# Patient Record
Sex: FEMALE | Race: WHITE | NOT HISPANIC OR LATINO
[De-identification: names, ages, dates, MRNs, and addresses within clinical notes are randomized per-mention and may not be internally consistent; named-entity substitution may affect disease eponyms.]

---

## 2018-10-16 ENCOUNTER — TRANSCRIPTION ENCOUNTER (OUTPATIENT)
Age: 38
End: 2018-10-16

## 2018-10-16 ENCOUNTER — EMERGENCY (EMERGENCY)
Facility: HOSPITAL | Age: 38
LOS: 1 days | Discharge: HOME | End: 2018-10-16
Attending: EMERGENCY MEDICINE | Admitting: EMERGENCY MEDICINE
Payer: COMMERCIAL

## 2018-10-16 VITALS
TEMPERATURE: 98 F | OXYGEN SATURATION: 99 % | SYSTOLIC BLOOD PRESSURE: 118 MMHG | DIASTOLIC BLOOD PRESSURE: 75 MMHG | RESPIRATION RATE: 15 BRPM | HEART RATE: 75 BPM

## 2018-10-16 VITALS
TEMPERATURE: 98 F | RESPIRATION RATE: 20 BRPM | HEART RATE: 85 BPM | OXYGEN SATURATION: 96 % | SYSTOLIC BLOOD PRESSURE: 112 MMHG | WEIGHT: 173.28 LBS | DIASTOLIC BLOOD PRESSURE: 68 MMHG

## 2018-10-16 DIAGNOSIS — Z3A.16 16 WEEKS GESTATION OF PREGNANCY: ICD-10-CM

## 2018-10-16 DIAGNOSIS — O26.892 OTHER SPECIFIED PREGNANCY RELATED CONDITIONS, SECOND TRIMESTER: ICD-10-CM

## 2018-10-16 DIAGNOSIS — N89.8 OTHER SPECIFIED NONINFLAMMATORY DISORDERS OF VAGINA: ICD-10-CM

## 2018-10-16 DIAGNOSIS — Z98.89 OTHER SPECIFIED POSTPROCEDURAL STATES: Chronic | ICD-10-CM

## 2018-10-16 LAB
ALBUMIN SERPL ELPH-MCNC: 4 G/DL — SIGNIFICANT CHANGE UP (ref 3.3–5)
ALP SERPL-CCNC: 69 U/L — SIGNIFICANT CHANGE UP (ref 40–120)
ALT FLD-CCNC: 14 U/L — SIGNIFICANT CHANGE UP (ref 10–45)
ANION GAP SERPL CALC-SCNC: 15 MMOL/L — SIGNIFICANT CHANGE UP (ref 5–17)
APPEARANCE UR: CLEAR — SIGNIFICANT CHANGE UP
AST SERPL-CCNC: 13 U/L — SIGNIFICANT CHANGE UP (ref 10–40)
BASOPHILS NFR BLD AUTO: 0.1 % — SIGNIFICANT CHANGE UP (ref 0–2)
BILIRUB SERPL-MCNC: <0.2 MG/DL — SIGNIFICANT CHANGE UP (ref 0.2–1.2)
BILIRUB UR-MCNC: NEGATIVE — SIGNIFICANT CHANGE UP
BLD GP AB SCN SERPL QL: NEGATIVE — SIGNIFICANT CHANGE UP
BUN SERPL-MCNC: 6 MG/DL — LOW (ref 7–23)
CALCIUM SERPL-MCNC: 9.6 MG/DL — SIGNIFICANT CHANGE UP (ref 8.4–10.5)
CHLORIDE SERPL-SCNC: 100 MMOL/L — SIGNIFICANT CHANGE UP (ref 96–108)
CO2 SERPL-SCNC: 24 MMOL/L — SIGNIFICANT CHANGE UP (ref 22–31)
COLOR SPEC: YELLOW — SIGNIFICANT CHANGE UP
CREAT SERPL-MCNC: 0.5 MG/DL — SIGNIFICANT CHANGE UP (ref 0.5–1.3)
DIFF PNL FLD: NEGATIVE — SIGNIFICANT CHANGE UP
EOSINOPHIL NFR BLD AUTO: 1.7 % — SIGNIFICANT CHANGE UP (ref 0–6)
GLUCOSE SERPL-MCNC: 90 MG/DL — SIGNIFICANT CHANGE UP (ref 70–99)
GLUCOSE UR QL: NEGATIVE — SIGNIFICANT CHANGE UP
HCG SERPL-ACNC: HIGH MIU/ML
HCT VFR BLD CALC: 36.3 % — SIGNIFICANT CHANGE UP (ref 34.5–45)
HGB BLD-MCNC: 11.9 G/DL — SIGNIFICANT CHANGE UP (ref 11.5–15.5)
KETONES UR-MCNC: NEGATIVE — SIGNIFICANT CHANGE UP
LEUKOCYTE ESTERASE UR-ACNC: NEGATIVE — SIGNIFICANT CHANGE UP
LYMPHOCYTES # BLD AUTO: 18.5 % — SIGNIFICANT CHANGE UP (ref 13–44)
MCHC RBC-ENTMCNC: 27 PG — SIGNIFICANT CHANGE UP (ref 27–34)
MCHC RBC-ENTMCNC: 32.8 G/DL — SIGNIFICANT CHANGE UP (ref 32–36)
MCV RBC AUTO: 82.5 FL — SIGNIFICANT CHANGE UP (ref 80–100)
MONOCYTES NFR BLD AUTO: 6.5 % — SIGNIFICANT CHANGE UP (ref 2–14)
NEUTROPHILS NFR BLD AUTO: 73.2 % — SIGNIFICANT CHANGE UP (ref 43–77)
NITRITE UR-MCNC: NEGATIVE — SIGNIFICANT CHANGE UP
PH UR: 7 — SIGNIFICANT CHANGE UP (ref 5–8)
PLATELET # BLD AUTO: 223 K/UL — SIGNIFICANT CHANGE UP (ref 150–400)
POTASSIUM SERPL-MCNC: 3.7 MMOL/L — SIGNIFICANT CHANGE UP (ref 3.5–5.3)
POTASSIUM SERPL-SCNC: 3.7 MMOL/L — SIGNIFICANT CHANGE UP (ref 3.5–5.3)
PROT SERPL-MCNC: 7.6 G/DL — SIGNIFICANT CHANGE UP (ref 6–8.3)
PROT UR-MCNC: NEGATIVE MG/DL — SIGNIFICANT CHANGE UP
RBC # BLD: 4.4 M/UL — SIGNIFICANT CHANGE UP (ref 3.8–5.2)
RBC # FLD: 14.9 % — SIGNIFICANT CHANGE UP (ref 10.3–16.9)
RH IG SCN BLD-IMP: POSITIVE — SIGNIFICANT CHANGE UP
SODIUM SERPL-SCNC: 139 MMOL/L — SIGNIFICANT CHANGE UP (ref 135–145)
SP GR SPEC: 1.01 — SIGNIFICANT CHANGE UP (ref 1–1.03)
UROBILINOGEN FLD QL: 0.2 E.U./DL — SIGNIFICANT CHANGE UP
WBC # BLD: 9.3 K/UL — SIGNIFICANT CHANGE UP (ref 3.8–10.5)
WBC # FLD AUTO: 9.3 K/UL — SIGNIFICANT CHANGE UP (ref 3.8–10.5)

## 2018-10-16 PROCEDURE — 81003 URINALYSIS AUTO W/O SCOPE: CPT

## 2018-10-16 PROCEDURE — 76805 OB US >/= 14 WKS SNGL FETUS: CPT | Mod: 26

## 2018-10-16 PROCEDURE — 86850 RBC ANTIBODY SCREEN: CPT

## 2018-10-16 PROCEDURE — 86900 BLOOD TYPING SEROLOGIC ABO: CPT

## 2018-10-16 PROCEDURE — 86901 BLOOD TYPING SEROLOGIC RH(D): CPT

## 2018-10-16 PROCEDURE — 84702 CHORIONIC GONADOTROPIN TEST: CPT

## 2018-10-16 PROCEDURE — 76817 TRANSVAGINAL US OBSTETRIC: CPT

## 2018-10-16 PROCEDURE — 99284 EMERGENCY DEPT VISIT MOD MDM: CPT

## 2018-10-16 PROCEDURE — 85025 COMPLETE CBC W/AUTO DIFF WBC: CPT

## 2018-10-16 PROCEDURE — 80053 COMPREHEN METABOLIC PANEL: CPT

## 2018-10-16 PROCEDURE — 84112 EVAL AMNIOTIC FLUID PROTEIN: CPT

## 2018-10-16 PROCEDURE — 76805 OB US >/= 14 WKS SNGL FETUS: CPT

## 2018-10-16 PROCEDURE — 36415 COLL VENOUS BLD VENIPUNCTURE: CPT

## 2018-10-16 PROCEDURE — 87086 URINE CULTURE/COLONY COUNT: CPT

## 2018-10-16 PROCEDURE — 76817 TRANSVAGINAL US OBSTETRIC: CPT | Mod: 26

## 2018-10-16 NOTE — ED PROVIDER NOTE - ATTENDING CONTRIBUTION TO CARE
pt seen and examined by me, key points of case nemesio NIEVES.  37 yo female 19 weeks pregnant co leaking fluid for a few days.  sent by her ob-gyn to check sono and to check if amniotic rupture.  vitals normal no fever. sono shows normal iup, normal amount of amniiotic fluid.  gyn saw patient, tested for amniotic rupture and test was negative.  to fu in office with her gyn

## 2018-10-16 NOTE — ED PROVIDER NOTE - MEDICAL DECISION MAKING DETAILS
Patient with 18 weeks gestation well appearing, NAD and vSS.  Labs and sonogram wnl. + FH and RH +. Gyn consulted and clear for discharge. Pt will with stable amniotic fluid will f/u with Dr. Fernández

## 2018-10-16 NOTE — ED ADULT TRIAGE NOTE - CHIEF COMPLAINT QUOTE
pt 19 weeks pregnant referred by Ob-Gyn for "leaking clear fluid 1 week". pt denies any fall or recent injuries. LMP .

## 2018-10-16 NOTE — ED PROVIDER NOTE - OBJECTIVE STATEMENT
39 y/o f , LMP in may, as per patient she will be 19 weeks tomorrow. She was 16 wks 1/2 ago by sonogram and partial placenta previa. She report of feeling leakage for one week.  Patient was sent in by Dr. Fernández for evaluation. Denies fever, vaginal bleeding, abd pain, dysuria, or recent intercourse.

## 2018-10-16 NOTE — ED ADULT NURSE NOTE - NSIMPLEMENTINTERV_GEN_ALL_ED
Implemented All Universal Safety Interventions:  [place] to call system. Call bell, personal items and telephone within reach. Instruct patient to call for assistance. Room bathroom lighting operational. Non-slip footwear when patient is off stretcher. Physically safe environment: no spills, clutter or unnecessary equipment. Stretcher in lowest position, wheels locked, appropriate side rails in place.

## 2018-10-16 NOTE — ED ADULT NURSE NOTE - CAS EDN DISCHARGE ASSESSMENT
Symptoms improved/Dressing clean and dry/No adverse reaction to first time med in ED/Alert and oriented to person, place and time/Awake/Patient baseline mental status

## 2018-10-16 NOTE — CONSULT NOTE ADULT - ASSESSMENT
Assessment and Plan:   39 yo  presents for evaluation of leaking fluid. No evidence of rupture or  labor at this time. Reassuring fetal status.   - No acute gyn intervention needed at this time  - Counseled to take over the counter monistat for yeast infection  - Routine OB follow up as scheduled   - Return for leaking, bleeding, cramps    D/w Dr. Bonilla Assessment and Plan:   37 yo  presents for evaluation of leaking fluid. No evidence of rupture or  labor at this time. Reassuring fetal status.   - No acute gyn intervention needed at this time  - Counseled to take over the counter monistat for yeast infection  - Routine OB follow up as scheduled   - Return for leaking, bleeding, cramps    Patient evaluated with Tabitha Trinh, PGY4 and discussed with Dr. Bonilla

## 2018-10-16 NOTE — CONSULT NOTE ADULT - SUBJECTIVE AND OBJECTIVE BOX
39 yo  presents with leaking of fluid -clear for 1 week. Patient is unsure if this is leakage of fluid. No bleeding, no contractions, abdominal, dysuria, vaginal irritation. Diagnosed with partial previa diagnosed at early anatomy otherwise uncomplicated pregnancy thus far. Normal NIPT.         OBHx: CS in 2016 for arrest and NRFHT; MAB with DC   GYN Hx: Denies  PMHx: Denies  SHx: CS, D&C   Meds: PNV  Allergies: None     PHYSICAL EXAM:   Vital Signs Last 24 Hrs  T(C): 36.7 (16 Oct 2018 19:17), Max: 36.7 (16 Oct 2018 13:40)  T(F): 98 (16 Oct 2018 19:17), Max: 98 (16 Oct 2018 13:40)  HR: 89 (16 Oct 2018 19:17) (85 - 89)  BP: 125/83 (16 Oct 2018 19:17) (112/68 - 125/83)  BP(mean): --  RR: 20 (16 Oct 2018 19:17) (20 - 20)  SpO2: 100% (16 Oct 2018 19:17) (96% - 100%)    **************************  Constitutional: Alert & Oriented x3, No acute distress  Gastrointestinal: soft, non tender, positive bowel sounds, no rebound or guarding, well-healed CS scar  Pelvic exam: normal cervix, pooling of discharge noted, nitrazine negative, mild yeast seen, cervix appears closed  Extremities: no calf tenderness or swelling      LABS:                        11.9   9.3   )-----------( 223      ( 16 Oct 2018 14:42 )             36.3     10-16    139  |  100  |  6<L>  ----------------------------<  90  3.7   |  24  |  0.50    Ca    9.6      16 Oct 2018 14:42    TPro  7.6  /  Alb  4.0  /  TBili  <0.2  /  DBili  x   /  AST  13  /  ALT  14  /  AlkPhos  69  10-16      Urinalysis Basic - ( 16 Oct 2018 14:42 )    Color: Yellow / Appearance: Clear / S.010 / pH: x  Gluc: x / Ketone: NEGATIVE  / Bili: Negative / Urobili: 0.2 E.U./dL   Blood: x / Protein: NEGATIVE mg/dL / Nitrite: NEGATIVE   Leuk Esterase: NEGATIVE / RBC: x / WBC x   Sq Epi: x / Non Sq Epi: x / Bacteria: x      HCG Quantitative, Serum: 50631.0 mIU/mL (10- @ 14:42)      RADIOLOGY & ADDITIONAL STUDIES: 39 yo  presents with leaking of fluid -clear for 1 week. Patient is unsure if this is leakage of urine. Patient denies bleeding, contractions, abdominal pain, dysuria, vaginal irritation. Diagnosed with partial previa diagnosed at early anatomy otherwise uncomplicated pregnancy thus far. Normal NIPT.     OBHx: CS in 2016 for arrest and NRFHT; MAB with DC   GYN Hx: Denies  PMHx: Denies  SHx: CS, D&C   Meds: PNV  Allergies: None     PHYSICAL EXAM:   Vital Signs Last 24 Hrs  T(C): 36.7 (16 Oct 2018 19:17), Max: 36.7 (16 Oct 2018 13:40)  T(F): 98 (16 Oct 2018 19:17), Max: 98 (16 Oct 2018 13:40)  HR: 89 (16 Oct 2018 19:17) (85 - 89)  BP: 125/83 (16 Oct 2018 19:17) (112/68 - 125/83)  BP(mean): --  RR: 20 (16 Oct 2018 19:17) (20 - 20)  SpO2: 100% (16 Oct 2018 19:17) (96% - 100%)    **************************  Constitutional: Alert & Oriented x3, No acute distress  Gastrointestinal: soft, non tender, positive bowel sounds, no rebound or guarding, well-healed CS scar  Pelvic exam: normal cervix, pooling of discharge noted, nitrazine negative, mild yeast seen, cervix appears closed  Extremities: no calf tenderness or swelling      LABS:                        11.9   9.3   )-----------( 223      ( 16 Oct 2018 14:42 )             36.3     10-16    139  |  100  |  6<L>  ----------------------------<  90  3.7   |  24  |  0.50    Ca    9.6      16 Oct 2018 14:42    TPro  7.6  /  Alb  4.0  /  TBili  <0.2  /  DBili  x   /  AST  13  /  ALT  14  /  AlkPhos  69  10-16      Urinalysis Basic - ( 16 Oct 2018 14:42 )    Color: Yellow / Appearance: Clear / S.010 / pH: x  Gluc: x / Ketone: NEGATIVE  / Bili: Negative / Urobili: 0.2 E.U./dL   Blood: x / Protein: NEGATIVE mg/dL / Nitrite: NEGATIVE   Leuk Esterase: NEGATIVE / RBC: x / WBC x   Sq Epi: x / Non Sq Epi: x / Bacteria: x      HCG Quantitative, Serum: 56630.0 mIU/mL (10-16 @ 14:42)    Amnisure negative.     RADIOLOGY & ADDITIONAL STUDIES:  < from: US Echo OB >=14 Weeks, First Gestation (10.16.18 @ 16:41) >  EXAM:  US OB TRANSVAGINAL                          EXAM:  US OB GRT THAN 14 WKS 1ST GEST                          PROCEDURE DATE:  10/16/2018          INTERPRETATION:  OBSTETRICAL ULTRASOUND -SECOND TRIMESTER dated   10/16/2018 4:41 PM    INDICATION: First trimester pregnancy with leaking amniotic fluid. LMP:   2018. 38-year-old female.    TECHNIQUE: Transabdominal views of the pelvis were obtained followed by   transvaginal views for better visualization of the endometrial cavity.      PRIOR STUDIES: Abdominal ultrasound 2016.    FINDINGS:   By dates, the estimated gestational age is 18 weeks 5 days.    A single intrauterine gestation is visible with an average ultrasound age   of 18 weeks 6 days.     Biparietal diameter is 4.2 cm, corresponding to a gestational age of 18   weeks 6 days.  Head circumference is 6.0 cm, corresponding to a gestational age of 18   weeks 6 days.  Abdominal circumference is 13.2 cm, corresponding to a gestational age of   18 weeks 6 days.  Femur length is 2.9 cm, corresponding to a gestational age of 18 weeks 6   days.  Fetal cardiac motion is visible with fetal heart rate of 158 beats per   minute.    Evaluation of fetal  anatomy is extremely limited on this study.    A normal amount of amniotic fluid is evident. The amniotic fluid index   measures 12.1 cm. The placenta is located posteriorly. No placenta previa   is evident. No subchorionic bleed/retroplacental hemorrhage is visible.    The cervix is closed with a length of 3.4 cm.    The uterus is anteverted. The uterus is 15.6 x 8.2 x 6.4 cm.  No   myometrial abnormalities are seen.     The right ovary is enlarged, measuring 5.6 x 2.2 x 3.9 cm. No right   ovarian masses are seen. The left ovary is not visualized. Doppler   evaluationdemonstrates flow to the right ovary with no evidence of   torsion.    No free fluid in pelvis.      IMPRESSION:   Single live intrauterine gestation with an average ultrasound age of 18   weeks 6 days.  Recommend follow-up at 20 weeks gestational age to assess the fetus.      "Thank you for the opportunity to participate in the care of this   patient."    SREE JEFFERY M.D., RADIOLOGY RESIDENT  This document has been electronically signed.  DEB BROWN M.D., ATTENDING RADIOLOGIST  This document has been electronically signed. Oct 16 2018  7:01PM 39 yo  presents with leaking of fluid -clear for 1 week. Patient is unsure if this is leakage of urine or just discharge of pregnancy. Patient denies bleeding, contractions, abdominal pain, dysuria, and vaginal irritation. Diagnosed with partial previa at early anatomy scan otherwise uncomplicated pregnancy thus far. Normal NIPT.     OBHx: CS in 2016 for arrest and NRFHT; MAB with DC   GYN Hx: Denies  PMHx: Denies  SHx: CS, D&C   Meds: PNV  Allergies: None     PHYSICAL EXAM:   Vital Signs Last 24 Hrs  T(C): 36.7 (16 Oct 2018 19:17), Max: 36.7 (16 Oct 2018 13:40)  T(F): 98 (16 Oct 2018 19:17), Max: 98 (16 Oct 2018 13:40)  HR: 89 (16 Oct 2018 19:17) (85 - 89)  BP: 125/83 (16 Oct 2018 19:17) (112/68 - 125/83)  BP(mean): --  RR: 20 (16 Oct 2018 19:17) (20 - 20)  SpO2: 100% (16 Oct 2018 19:17) (96% - 100%)    **************************  Constitutional: Alert & Oriented x3, No acute distress  Gastrointestinal: soft, non tender, positive bowel sounds, no rebound or guarding, well-healed CS scar  Pelvic exam: normal cervix, pooling of discharge noted, nitrazine negative, mild yeast seen, cervix appears closed  Extremities: no calf tenderness or swelling      LABS:                        11.9   9.3   )-----------( 223      ( 16 Oct 2018 14:42 )             36.3     10-16    139  |  100  |  6<L>  ----------------------------<  90  3.7   |  24  |  0.50    Ca    9.6      16 Oct 2018 14:42    TPro  7.6  /  Alb  4.0  /  TBili  <0.2  /  DBili  x   /  AST  13  /  ALT  14  /  AlkPhos  69  10-16      Urinalysis Basic - ( 16 Oct 2018 14:42 )    Color: Yellow / Appearance: Clear / S.010 / pH: x  Gluc: x / Ketone: NEGATIVE  / Bili: Negative / Urobili: 0.2 E.U./dL   Blood: x / Protein: NEGATIVE mg/dL / Nitrite: NEGATIVE   Leuk Esterase: NEGATIVE / RBC: x / WBC x   Sq Epi: x / Non Sq Epi: x / Bacteria: x      HCG Quantitative, Serum: 52158.0 mIU/mL (10-16 @ 14:42)    Amnisure negative.     RADIOLOGY & ADDITIONAL STUDIES:  < from: US Echo OB >=14 Weeks, First Gestation (10.16.18 @ 16:41) >  EXAM:  US OB TRANSVAGINAL                          EXAM:  US OB GRT THAN 14 WKS 1ST GEST                          PROCEDURE DATE:  10/16/2018          INTERPRETATION:  OBSTETRICAL ULTRASOUND -SECOND TRIMESTER dated   10/16/2018 4:41 PM    INDICATION: First trimester pregnancy with leaking amniotic fluid. LMP:   2018. 38-year-old female.    TECHNIQUE: Transabdominal views of the pelvis were obtained followed by   transvaginal views for better visualization of the endometrial cavity.      PRIOR STUDIES: Abdominal ultrasound 2016.    FINDINGS:   By dates, the estimated gestational age is 18 weeks 5 days.    A single intrauterine gestation is visible with an average ultrasound age   of 18 weeks 6 days.     Biparietal diameter is 4.2 cm, corresponding to a gestational age of 18   weeks 6 days.  Head circumference is 6.0 cm, corresponding to a gestational age of 18   weeks 6 days.  Abdominal circumference is 13.2 cm, corresponding to a gestational age of   18 weeks 6 days.  Femur length is 2.9 cm, corresponding to a gestational age of 18 weeks 6   days.  Fetal cardiac motion is visible with fetal heart rate of 158 beats per   minute.    Evaluation of fetal  anatomy is extremely limited on this study.    A normal amount of amniotic fluid is evident. The amniotic fluid index   measures 12.1 cm. The placenta is located posteriorly. No placenta previa   is evident. No subchorionic bleed/retroplacental hemorrhage is visible.    The cervix is closed with a length of 3.4 cm.    The uterus is anteverted. The uterus is 15.6 x 8.2 x 6.4 cm.  No   myometrial abnormalities are seen.     The right ovary is enlarged, measuring 5.6 x 2.2 x 3.9 cm. No right   ovarian masses are seen. The left ovary is not visualized. Doppler   evaluationdemonstrates flow to the right ovary with no evidence of   torsion.    No free fluid in pelvis.      IMPRESSION:   Single live intrauterine gestation with an average ultrasound age of 18   weeks 6 days.  Recommend follow-up at 20 weeks gestational age to assess the fetus.      "Thank you for the opportunity to participate in the care of this   patient."    SREE JEFFERY M.D., RADIOLOGY RESIDENT  This document has been electronically signed.  DEB BROWN M.D., ATTENDING RADIOLOGIST  This document has been electronically signed. Oct 16 2018  7:01PM

## 2018-10-16 NOTE — ED ADULT TRIAGE NOTE - NS ED TRIAGE AVPU SCALE
Patient left a voicemail stating that she was told that Yen Sinha wanted to speak to her before her appointment.  Patient is requesting a call back.   Alert-The patient is alert, awake and responds to voice. The patient is oriented to time, place, and person. The triage nurse is able to obtain subjective information.

## 2018-10-18 LAB
CULTURE RESULTS: SIGNIFICANT CHANGE UP
SPECIMEN SOURCE: SIGNIFICANT CHANGE UP

## 2019-02-04 ENCOUNTER — OUTPATIENT (OUTPATIENT)
Dept: OUTPATIENT SERVICES | Facility: HOSPITAL | Age: 39
LOS: 1 days | End: 2019-02-04
Payer: COMMERCIAL

## 2019-02-04 DIAGNOSIS — Z98.89 OTHER SPECIFIED POSTPROCEDURAL STATES: Chronic | ICD-10-CM

## 2019-02-04 DIAGNOSIS — O26.899 OTHER SPECIFIED PREGNANCY RELATED CONDITIONS, UNSPECIFIED TRIMESTER: ICD-10-CM

## 2019-02-04 DIAGNOSIS — Z3A.00 WEEKS OF GESTATION OF PREGNANCY NOT SPECIFIED: ICD-10-CM

## 2019-02-04 LAB
ALBUMIN SERPL ELPH-MCNC: 3 G/DL — LOW (ref 3.3–5)
ALP SERPL-CCNC: 147 U/L — HIGH (ref 40–120)
ALT FLD-CCNC: 13 U/L — SIGNIFICANT CHANGE UP (ref 10–45)
ANION GAP SERPL CALC-SCNC: 12 MMOL/L — SIGNIFICANT CHANGE UP (ref 5–17)
APPEARANCE UR: CLEAR — SIGNIFICANT CHANGE UP
APTT BLD: 29.6 SEC — SIGNIFICANT CHANGE UP (ref 27.5–36.3)
AST SERPL-CCNC: 14 U/L — SIGNIFICANT CHANGE UP (ref 10–40)
BASOPHILS NFR BLD AUTO: 0.1 % — SIGNIFICANT CHANGE UP (ref 0–2)
BILIRUB SERPL-MCNC: <0.2 MG/DL — SIGNIFICANT CHANGE UP (ref 0.2–1.2)
BILIRUB UR-MCNC: NEGATIVE — SIGNIFICANT CHANGE UP
BUN SERPL-MCNC: 8 MG/DL — SIGNIFICANT CHANGE UP (ref 7–23)
CALCIUM SERPL-MCNC: 9.1 MG/DL — SIGNIFICANT CHANGE UP (ref 8.4–10.5)
CHLORIDE SERPL-SCNC: 107 MMOL/L — SIGNIFICANT CHANGE UP (ref 96–108)
CO2 SERPL-SCNC: 17 MMOL/L — LOW (ref 22–31)
COLOR SPEC: YELLOW — SIGNIFICANT CHANGE UP
CREAT ?TM UR-MCNC: 101 MG/DL — SIGNIFICANT CHANGE UP
CREAT SERPL-MCNC: 0.48 MG/DL — LOW (ref 0.5–1.3)
DIFF PNL FLD: NEGATIVE — SIGNIFICANT CHANGE UP
EOSINOPHIL NFR BLD AUTO: 2.1 % — SIGNIFICANT CHANGE UP (ref 0–6)
FIBRINOGEN PPP-MCNC: 415 MG/DL — SIGNIFICANT CHANGE UP (ref 258–438)
GLUCOSE SERPL-MCNC: 102 MG/DL — HIGH (ref 70–99)
GLUCOSE UR QL: NEGATIVE — SIGNIFICANT CHANGE UP
HCT VFR BLD CALC: 32.9 % — LOW (ref 34.5–45)
HGB BLD-MCNC: 10.6 G/DL — LOW (ref 11.5–15.5)
INR BLD: 1.03 — SIGNIFICANT CHANGE UP (ref 0.88–1.16)
KETONES UR-MCNC: NEGATIVE — SIGNIFICANT CHANGE UP
LDH SERPL L TO P-CCNC: 145 U/L — SIGNIFICANT CHANGE UP (ref 50–242)
LEUKOCYTE ESTERASE UR-ACNC: NEGATIVE — SIGNIFICANT CHANGE UP
LYMPHOCYTES # BLD AUTO: 21.9 % — SIGNIFICANT CHANGE UP (ref 13–44)
MCHC RBC-ENTMCNC: 26 PG — LOW (ref 27–34)
MCHC RBC-ENTMCNC: 32.2 G/DL — SIGNIFICANT CHANGE UP (ref 32–36)
MCV RBC AUTO: 80.8 FL — SIGNIFICANT CHANGE UP (ref 80–100)
MONOCYTES NFR BLD AUTO: 6.1 % — SIGNIFICANT CHANGE UP (ref 2–14)
NEUTROPHILS NFR BLD AUTO: 69.8 % — SIGNIFICANT CHANGE UP (ref 43–77)
NITRITE UR-MCNC: NEGATIVE — SIGNIFICANT CHANGE UP
PH UR: 7 — SIGNIFICANT CHANGE UP (ref 5–8)
PLATELET # BLD AUTO: 213 K/UL — SIGNIFICANT CHANGE UP (ref 150–400)
POTASSIUM SERPL-MCNC: 3.8 MMOL/L — SIGNIFICANT CHANGE UP (ref 3.5–5.3)
POTASSIUM SERPL-SCNC: 3.8 MMOL/L — SIGNIFICANT CHANGE UP (ref 3.5–5.3)
PROT ?TM UR-MCNC: 10 MG/DL — SIGNIFICANT CHANGE UP (ref 0–12)
PROT SERPL-MCNC: 6.1 G/DL — SIGNIFICANT CHANGE UP (ref 6–8.3)
PROT UR-MCNC: NEGATIVE MG/DL — SIGNIFICANT CHANGE UP
PROT/CREAT UR-RTO: 0.1 RATIO — SIGNIFICANT CHANGE UP (ref 0–0.2)
PROTHROM AB SERPL-ACNC: 11.7 SEC — SIGNIFICANT CHANGE UP (ref 10–12.9)
RBC # BLD: 4.07 M/UL — SIGNIFICANT CHANGE UP (ref 3.8–5.2)
RBC # FLD: 15 % — SIGNIFICANT CHANGE UP (ref 10.3–16.9)
SODIUM SERPL-SCNC: 136 MMOL/L — SIGNIFICANT CHANGE UP (ref 135–145)
SP GR SPEC: 1.02 — SIGNIFICANT CHANGE UP (ref 1–1.03)
URATE SERPL-MCNC: 2.1 MG/DL — LOW (ref 2.5–7)
UROBILINOGEN FLD QL: 0.2 E.U./DL — SIGNIFICANT CHANGE UP
WBC # BLD: 7.2 K/UL — SIGNIFICANT CHANGE UP (ref 3.8–10.5)
WBC # FLD AUTO: 7.2 K/UL — SIGNIFICANT CHANGE UP (ref 3.8–10.5)

## 2019-02-04 PROCEDURE — 85610 PROTHROMBIN TIME: CPT

## 2019-02-04 PROCEDURE — 99214 OFFICE O/P EST MOD 30 MIN: CPT

## 2019-02-04 PROCEDURE — 82570 ASSAY OF URINE CREATININE: CPT

## 2019-02-04 PROCEDURE — 36415 COLL VENOUS BLD VENIPUNCTURE: CPT

## 2019-02-04 PROCEDURE — 83615 LACTATE (LD) (LDH) ENZYME: CPT

## 2019-02-04 PROCEDURE — 85730 THROMBOPLASTIN TIME PARTIAL: CPT

## 2019-02-04 PROCEDURE — 84156 ASSAY OF PROTEIN URINE: CPT

## 2019-02-04 PROCEDURE — 81003 URINALYSIS AUTO W/O SCOPE: CPT

## 2019-02-04 PROCEDURE — 85384 FIBRINOGEN ACTIVITY: CPT

## 2019-02-04 PROCEDURE — 85025 COMPLETE CBC W/AUTO DIFF WBC: CPT

## 2019-02-04 PROCEDURE — 80053 COMPREHEN METABOLIC PANEL: CPT

## 2019-02-04 PROCEDURE — 84550 ASSAY OF BLOOD/URIC ACID: CPT

## 2019-02-06 DIAGNOSIS — O09.523 SUPERVISION OF ELDERLY MULTIGRAVIDA, THIRD TRIMESTER: ICD-10-CM

## 2019-03-05 ENCOUNTER — OUTPATIENT (OUTPATIENT)
Dept: OUTPATIENT SERVICES | Facility: HOSPITAL | Age: 39
LOS: 1 days | End: 2019-03-05
Payer: COMMERCIAL

## 2019-03-05 DIAGNOSIS — Z01.818 ENCOUNTER FOR OTHER PREPROCEDURAL EXAMINATION: ICD-10-CM

## 2019-03-05 DIAGNOSIS — Z98.89 OTHER SPECIFIED POSTPROCEDURAL STATES: Chronic | ICD-10-CM

## 2019-03-05 LAB
APTT BLD: 28.8 SEC — SIGNIFICANT CHANGE UP (ref 27.5–36.3)
BLD GP AB SCN SERPL QL: NEGATIVE — SIGNIFICANT CHANGE UP
HCT VFR BLD CALC: 38.4 % — SIGNIFICANT CHANGE UP (ref 34.5–45)
HGB BLD-MCNC: 12 G/DL — SIGNIFICANT CHANGE UP (ref 11.5–15.5)
INR BLD: 1.03 — SIGNIFICANT CHANGE UP (ref 0.88–1.16)
MCHC RBC-ENTMCNC: 26 PG — LOW (ref 27–34)
MCHC RBC-ENTMCNC: 31.3 GM/DL — LOW (ref 32–36)
MCV RBC AUTO: 83.1 FL — SIGNIFICANT CHANGE UP (ref 80–100)
NRBC # BLD: 0 /100 WBCS — SIGNIFICANT CHANGE UP (ref 0–0)
PLATELET # BLD AUTO: 216 K/UL — SIGNIFICANT CHANGE UP (ref 150–400)
PROTHROM AB SERPL-ACNC: 11.6 SEC — SIGNIFICANT CHANGE UP (ref 10–12.9)
RBC # BLD: 4.62 M/UL — SIGNIFICANT CHANGE UP (ref 3.8–5.2)
RBC # FLD: 15.8 % — HIGH (ref 10.3–14.5)
RH IG SCN BLD-IMP: POSITIVE — SIGNIFICANT CHANGE UP
WBC # BLD: 8.23 K/UL — SIGNIFICANT CHANGE UP (ref 3.8–10.5)
WBC # FLD AUTO: 8.23 K/UL — SIGNIFICANT CHANGE UP (ref 3.8–10.5)

## 2019-03-05 PROCEDURE — 85610 PROTHROMBIN TIME: CPT

## 2019-03-05 PROCEDURE — 86850 RBC ANTIBODY SCREEN: CPT

## 2019-03-05 PROCEDURE — 85027 COMPLETE CBC AUTOMATED: CPT

## 2019-03-05 PROCEDURE — 86901 BLOOD TYPING SEROLOGIC RH(D): CPT

## 2019-03-05 PROCEDURE — 86900 BLOOD TYPING SEROLOGIC ABO: CPT

## 2019-03-05 PROCEDURE — 85730 THROMBOPLASTIN TIME PARTIAL: CPT

## 2019-03-06 ENCOUNTER — RESULT REVIEW (OUTPATIENT)
Age: 39
End: 2019-03-06

## 2019-03-06 ENCOUNTER — INPATIENT (INPATIENT)
Facility: HOSPITAL | Age: 39
LOS: 2 days | Discharge: HOME | End: 2019-03-09
Attending: OBSTETRICS & GYNECOLOGY | Admitting: OBSTETRICS & GYNECOLOGY
Payer: COMMERCIAL

## 2019-03-06 VITALS — WEIGHT: 189.6 LBS | HEIGHT: 62 IN

## 2019-03-06 DIAGNOSIS — Z98.89 OTHER SPECIFIED POSTPROCEDURAL STATES: Chronic | ICD-10-CM

## 2019-03-06 RX ORDER — FAMOTIDINE 10 MG/ML
20 INJECTION INTRAVENOUS ONCE
Qty: 0 | Refills: 0 | Status: DISCONTINUED | OUTPATIENT
Start: 2019-03-06 | End: 2019-03-06

## 2019-03-06 RX ORDER — HEPARIN SODIUM 5000 [USP'U]/ML
5000 INJECTION INTRAVENOUS; SUBCUTANEOUS EVERY 12 HOURS
Qty: 0 | Refills: 0 | Status: DISCONTINUED | OUTPATIENT
Start: 2019-03-07 | End: 2019-03-09

## 2019-03-06 RX ORDER — CITRIC ACID/SODIUM CITRATE 300-500 MG
30 SOLUTION, ORAL ORAL ONCE
Qty: 0 | Refills: 0 | Status: COMPLETED | OUTPATIENT
Start: 2019-03-06 | End: 2019-03-06

## 2019-03-06 RX ORDER — CEFAZOLIN SODIUM 1 G
2000 VIAL (EA) INJECTION ONCE
Qty: 0 | Refills: 0 | Status: COMPLETED | OUTPATIENT
Start: 2019-03-06 | End: 2019-03-06

## 2019-03-06 RX ORDER — IBUPROFEN 200 MG
600 TABLET ORAL EVERY 6 HOURS
Qty: 0 | Refills: 0 | Status: DISCONTINUED | OUTPATIENT
Start: 2019-03-06 | End: 2019-03-09

## 2019-03-06 RX ORDER — OXYCODONE HYDROCHLORIDE 5 MG/1
5 TABLET ORAL
Qty: 0 | Refills: 0 | Status: DISCONTINUED | OUTPATIENT
Start: 2019-03-06 | End: 2019-03-06

## 2019-03-06 RX ORDER — OXYTOCIN 10 UNIT/ML
41.67 VIAL (ML) INJECTION
Qty: 20 | Refills: 0 | Status: DISCONTINUED | OUTPATIENT
Start: 2019-03-06 | End: 2019-03-09

## 2019-03-06 RX ORDER — ACETAMINOPHEN 500 MG
650 TABLET ORAL EVERY 6 HOURS
Qty: 0 | Refills: 0 | Status: DISCONTINUED | OUTPATIENT
Start: 2019-03-06 | End: 2019-03-09

## 2019-03-06 RX ORDER — NALOXONE HYDROCHLORIDE 4 MG/.1ML
0.1 SPRAY NASAL
Qty: 0 | Refills: 0 | Status: DISCONTINUED | OUTPATIENT
Start: 2019-03-06 | End: 2019-03-06

## 2019-03-06 RX ORDER — OXYCODONE AND ACETAMINOPHEN 5; 325 MG/1; MG/1
2 TABLET ORAL EVERY 6 HOURS
Qty: 0 | Refills: 0 | Status: DISCONTINUED | OUTPATIENT
Start: 2019-03-06 | End: 2019-03-09

## 2019-03-06 RX ORDER — FERROUS SULFATE 325(65) MG
325 TABLET ORAL DAILY
Qty: 0 | Refills: 0 | Status: DISCONTINUED | OUTPATIENT
Start: 2019-03-06 | End: 2019-03-09

## 2019-03-06 RX ORDER — TETANUS TOXOID, REDUCED DIPHTHERIA TOXOID AND ACELLULAR PERTUSSIS VACCINE, ADSORBED 5; 2.5; 8; 8; 2.5 [IU]/.5ML; [IU]/.5ML; UG/.5ML; UG/.5ML; UG/.5ML
0.5 SUSPENSION INTRAMUSCULAR ONCE
Qty: 0 | Refills: 0 | Status: DISCONTINUED | OUTPATIENT
Start: 2019-03-06 | End: 2019-03-09

## 2019-03-06 RX ORDER — OXYCODONE AND ACETAMINOPHEN 5; 325 MG/1; MG/1
1 TABLET ORAL
Qty: 0 | Refills: 0 | Status: DISCONTINUED | OUTPATIENT
Start: 2019-03-06 | End: 2019-03-09

## 2019-03-06 RX ORDER — SODIUM CHLORIDE 9 MG/ML
500 INJECTION, SOLUTION INTRAVENOUS ONCE
Qty: 0 | Refills: 0 | Status: DISCONTINUED | OUTPATIENT
Start: 2019-03-06 | End: 2019-03-09

## 2019-03-06 RX ORDER — OXYCODONE HYDROCHLORIDE 5 MG/1
10 TABLET ORAL
Qty: 0 | Refills: 0 | Status: DISCONTINUED | OUTPATIENT
Start: 2019-03-06 | End: 2019-03-06

## 2019-03-06 RX ORDER — SODIUM CHLORIDE 9 MG/ML
1000 INJECTION, SOLUTION INTRAVENOUS
Qty: 0 | Refills: 0 | Status: DISCONTINUED | OUTPATIENT
Start: 2019-03-06 | End: 2019-03-09

## 2019-03-06 RX ORDER — ONDANSETRON 8 MG/1
4 TABLET, FILM COATED ORAL EVERY 6 HOURS
Qty: 0 | Refills: 0 | Status: DISCONTINUED | OUTPATIENT
Start: 2019-03-06 | End: 2019-03-06

## 2019-03-06 RX ORDER — LANOLIN
1 OINTMENT (GRAM) TOPICAL
Qty: 0 | Refills: 0 | Status: DISCONTINUED | OUTPATIENT
Start: 2019-03-06 | End: 2019-03-09

## 2019-03-06 RX ORDER — DIPHENHYDRAMINE HCL 50 MG
25 CAPSULE ORAL EVERY 6 HOURS
Qty: 0 | Refills: 0 | Status: DISCONTINUED | OUTPATIENT
Start: 2019-03-06 | End: 2019-03-09

## 2019-03-06 RX ORDER — KETOROLAC TROMETHAMINE 30 MG/ML
30 SYRINGE (ML) INJECTION EVERY 6 HOURS
Qty: 0 | Refills: 0 | Status: DISCONTINUED | OUTPATIENT
Start: 2019-03-06 | End: 2019-03-06

## 2019-03-06 RX ORDER — HYDROMORPHONE HYDROCHLORIDE 2 MG/ML
1 INJECTION INTRAMUSCULAR; INTRAVENOUS; SUBCUTANEOUS
Qty: 0 | Refills: 0 | Status: DISCONTINUED | OUTPATIENT
Start: 2019-03-06 | End: 2019-03-06

## 2019-03-06 RX ORDER — DIPHENHYDRAMINE HCL 50 MG
12.5 CAPSULE ORAL EVERY 4 HOURS
Qty: 0 | Refills: 0 | Status: DISCONTINUED | OUTPATIENT
Start: 2019-03-06 | End: 2019-03-06

## 2019-03-06 RX ORDER — METOCLOPRAMIDE HCL 10 MG
10 TABLET ORAL ONCE
Qty: 0 | Refills: 0 | Status: DISCONTINUED | OUTPATIENT
Start: 2019-03-06 | End: 2019-03-06

## 2019-03-06 RX ORDER — SODIUM CHLORIDE 9 MG/ML
1000 INJECTION, SOLUTION INTRAVENOUS
Qty: 0 | Refills: 0 | Status: DISCONTINUED | OUTPATIENT
Start: 2019-03-06 | End: 2019-03-06

## 2019-03-06 RX ORDER — SODIUM CHLORIDE 9 MG/ML
1000 INJECTION, SOLUTION INTRAVENOUS ONCE
Qty: 0 | Refills: 0 | Status: COMPLETED | OUTPATIENT
Start: 2019-03-06 | End: 2019-03-06

## 2019-03-06 RX ORDER — GLYCERIN ADULT
1 SUPPOSITORY, RECTAL RECTAL AT BEDTIME
Qty: 0 | Refills: 0 | Status: DISCONTINUED | OUTPATIENT
Start: 2019-03-06 | End: 2019-03-09

## 2019-03-06 RX ORDER — DOCUSATE SODIUM 100 MG
100 CAPSULE ORAL
Qty: 0 | Refills: 0 | Status: DISCONTINUED | OUTPATIENT
Start: 2019-03-06 | End: 2019-03-09

## 2019-03-06 RX ORDER — SIMETHICONE 80 MG/1
80 TABLET, CHEWABLE ORAL EVERY 4 HOURS
Qty: 0 | Refills: 0 | Status: DISCONTINUED | OUTPATIENT
Start: 2019-03-06 | End: 2019-03-09

## 2019-03-06 RX ADMIN — Medication 125 MILLIUNIT(S)/MIN: at 15:49

## 2019-03-06 RX ADMIN — Medication 600 MILLIGRAM(S): at 18:05

## 2019-03-06 RX ADMIN — SODIUM CHLORIDE 2000 MILLILITER(S): 9 INJECTION, SOLUTION INTRAVENOUS at 10:45

## 2019-03-06 RX ADMIN — SODIUM CHLORIDE 125 MILLILITER(S): 9 INJECTION, SOLUTION INTRAVENOUS at 11:29

## 2019-03-06 RX ADMIN — Medication 30 MILLILITER(S): at 13:48

## 2019-03-06 RX ADMIN — Medication 100 MILLIGRAM(S): at 13:48

## 2019-03-06 RX ADMIN — Medication 600 MILLIGRAM(S): at 23:46

## 2019-03-06 NOTE — PROGRESS NOTE ADULT - SUBJECTIVE AND OBJECTIVE BOX
alerted by nursing low UO with 50cc over the past 3 hours, patient is tolerating PO and hydrating appropriately, hemodynamically stable and feeling well  500cc LR bolus given, will continue to monitor UO and f/u with am CBC which is already ordered

## 2019-03-07 LAB
HCT VFR BLD CALC: 29.2 % — LOW (ref 34.5–45)
HGB BLD-MCNC: 9 G/DL — LOW (ref 11.5–15.5)
MCHC RBC-ENTMCNC: 25.5 PG — LOW (ref 27–34)
MCHC RBC-ENTMCNC: 30.8 GM/DL — LOW (ref 32–36)
MCV RBC AUTO: 82.7 FL — SIGNIFICANT CHANGE UP (ref 80–100)
NRBC # BLD: 0 /100 WBCS — SIGNIFICANT CHANGE UP (ref 0–0)
PLATELET # BLD AUTO: 165 K/UL — SIGNIFICANT CHANGE UP (ref 150–400)
RBC # BLD: 3.53 M/UL — LOW (ref 3.8–5.2)
RBC # FLD: 15.8 % — HIGH (ref 10.3–14.5)
T PALLIDUM AB TITR SER: NEGATIVE — SIGNIFICANT CHANGE UP
WBC # BLD: 8.91 K/UL — SIGNIFICANT CHANGE UP (ref 3.8–10.5)
WBC # FLD AUTO: 8.91 K/UL — SIGNIFICANT CHANGE UP (ref 3.8–10.5)

## 2019-03-07 RX ADMIN — HEPARIN SODIUM 5000 UNIT(S): 5000 INJECTION INTRAVENOUS; SUBCUTANEOUS at 10:13

## 2019-03-07 RX ADMIN — Medication 600 MILLIGRAM(S): at 18:50

## 2019-03-07 RX ADMIN — Medication 600 MILLIGRAM(S): at 08:00

## 2019-03-07 RX ADMIN — Medication 600 MILLIGRAM(S): at 07:08

## 2019-03-07 RX ADMIN — OXYCODONE AND ACETAMINOPHEN 1 TABLET(S): 5; 325 TABLET ORAL at 22:00

## 2019-03-07 RX ADMIN — Medication 1 TABLET(S): at 12:55

## 2019-03-07 RX ADMIN — Medication 600 MILLIGRAM(S): at 00:30

## 2019-03-07 RX ADMIN — Medication 600 MILLIGRAM(S): at 19:45

## 2019-03-07 RX ADMIN — Medication 100 MILLIGRAM(S): at 12:55

## 2019-03-07 RX ADMIN — OXYCODONE AND ACETAMINOPHEN 1 TABLET(S): 5; 325 TABLET ORAL at 20:58

## 2019-03-07 RX ADMIN — Medication 600 MILLIGRAM(S): at 12:55

## 2019-03-07 RX ADMIN — HEPARIN SODIUM 5000 UNIT(S): 5000 INJECTION INTRAVENOUS; SUBCUTANEOUS at 23:33

## 2019-03-07 RX ADMIN — Medication 325 MILLIGRAM(S): at 12:55

## 2019-03-07 NOTE — PROGRESS NOTE ADULT - ASSESSMENT
38y Female POD1    s/p C/S, Uncomplicated                                       1. Neuro/Pain:  OPM  2  CV:  VS per routine  3. Pulm: Encourage ISS & Ambulation  4. GI:  Advance as megha  5. : Van in place, TOV  6. DVT ppx: SCDs, SQH 5000 mg BID  7. Dispo: POD #3 or #4

## 2019-03-07 NOTE — PROGRESS NOTE ADULT - SUBJECTIVE AND OBJECTIVE BOX
Patient evaluated at bedside. No acute events overnight. She reports pain is well controlled with OPM. Adequate urine output.     Physical Exam:  Vital Signs Last 24 Hrs  T(C): 36.6 (07 Mar 2019 06:00), Max: 36.7 (06 Mar 2019 22:00)  T(F): 97.9 (07 Mar 2019 06:00), Max: 98 (06 Mar 2019 22:00)  HR: 70 (07 Mar 2019 06:00) (60 - 80)  BP: 101/67 (07 Mar 2019 06:00) (93/53 - 115/67)  BP(mean): --  RR: 17 (07 Mar 2019 06:00) (13 - 26)  SpO2: 98% (07 Mar 2019 06:00) (94% - 100%)    GA: NAD, A+0 x 3  Abd: + BS, soft, nontender, nondistended, no rebound or guarding, uterus firm at midline, 2 fb below umbilicus  Incision clean, dry and intact  : lochia WNL  Extremities: no swelling or calf tenderness                            9.0    8.91  )-----------( 165      ( 07 Mar 2019 05:46 )             29.2             PT/INR - ( 05 Mar 2019 12:55 )   PT: 11.6 sec;   INR: 1.03          PTT - ( 05 Mar 2019 12:55 )  PTT:28.8 sec    A/P  yo 38y s/p c/s, POD #1  ,stable    Diet: clears until flatus  Pain: OPM  OOB/SCDs/IS  Continue to monitor  Anticipate discharge POD #3 or #4

## 2019-03-07 NOTE — PROGRESS NOTE ADULT - SUBJECTIVE AND OBJECTIVE BOX
Patient evaluated at bedside.   She reports pain is well controlled.  Van in place  No Flatus  Not OOB yet.    She denies HA, dizziness, CP, palpitations, SOB, n/v, or heavy vaginal bleeding.    Physical Exam:  Vital Signs Last 24 Hrs  T(C): 36.6 (07 Mar 2019 06:00), Max: 36.7 (06 Mar 2019 22:00)  T(F): 97.9 (07 Mar 2019 06:00), Max: 98 (06 Mar 2019 22:00)  HR: 70 (07 Mar 2019 06:00) (60 - 80)  BP: 101/67 (07 Mar 2019 06:00) (93/53 - 115/67)  BP(mean): --  RR: 17 (07 Mar 2019 06:00) (13 - 26)  SpO2: 98% (07 Mar 2019 06:00) (94% - 100%)    Gen: NAD  Abd: + BS, soft, nontender, nondistended, no rebound or guarding  Incision clean, dry and intact  uterus firm at midline  : lochia WNL  Extremities: no swelling or calf tenderness                          12.0   8.23  )-----------( 216      ( 05 Mar 2019 12:55 )             38.4             PT/INR - ( 05 Mar 2019 12:55 )   PT: 11.6 sec;   INR: 1.03          PTT - ( 05 Mar 2019 12:55 )  PTT:28.8 sec

## 2019-03-08 RX ADMIN — OXYCODONE AND ACETAMINOPHEN 1 TABLET(S): 5; 325 TABLET ORAL at 18:44

## 2019-03-08 RX ADMIN — Medication 100 MILLIGRAM(S): at 18:20

## 2019-03-08 RX ADMIN — HEPARIN SODIUM 5000 UNIT(S): 5000 INJECTION INTRAVENOUS; SUBCUTANEOUS at 10:20

## 2019-03-08 RX ADMIN — Medication 100 MILLIGRAM(S): at 10:25

## 2019-03-08 RX ADMIN — OXYCODONE AND ACETAMINOPHEN 1 TABLET(S): 5; 325 TABLET ORAL at 07:41

## 2019-03-08 RX ADMIN — OXYCODONE AND ACETAMINOPHEN 1 TABLET(S): 5; 325 TABLET ORAL at 08:41

## 2019-03-08 RX ADMIN — Medication 600 MILLIGRAM(S): at 14:27

## 2019-03-08 RX ADMIN — OXYCODONE AND ACETAMINOPHEN 1 TABLET(S): 5; 325 TABLET ORAL at 16:27

## 2019-03-08 RX ADMIN — Medication 1 APPLICATION(S): at 22:13

## 2019-03-08 RX ADMIN — Medication 600 MILLIGRAM(S): at 02:01

## 2019-03-08 RX ADMIN — Medication 650 MILLIGRAM(S): at 18:19

## 2019-03-08 RX ADMIN — Medication 600 MILLIGRAM(S): at 03:00

## 2019-03-08 RX ADMIN — OXYCODONE AND ACETAMINOPHEN 1 TABLET(S): 5; 325 TABLET ORAL at 23:20

## 2019-03-08 RX ADMIN — Medication 325 MILLIGRAM(S): at 13:01

## 2019-03-08 RX ADMIN — Medication 600 MILLIGRAM(S): at 19:33

## 2019-03-08 RX ADMIN — HEPARIN SODIUM 5000 UNIT(S): 5000 INJECTION INTRAVENOUS; SUBCUTANEOUS at 22:23

## 2019-03-08 RX ADMIN — Medication 600 MILLIGRAM(S): at 20:33

## 2019-03-08 RX ADMIN — OXYCODONE AND ACETAMINOPHEN 1 TABLET(S): 5; 325 TABLET ORAL at 11:41

## 2019-03-08 RX ADMIN — Medication 600 MILLIGRAM(S): at 13:01

## 2019-03-08 RX ADMIN — OXYCODONE AND ACETAMINOPHEN 1 TABLET(S): 5; 325 TABLET ORAL at 14:25

## 2019-03-08 RX ADMIN — OXYCODONE AND ACETAMINOPHEN 1 TABLET(S): 5; 325 TABLET ORAL at 22:21

## 2019-03-08 NOTE — PROGRESS NOTE ADULT - SUBJECTIVE AND OBJECTIVE BOX
pt doing well  no c/o  ICU Vital Signs Last 24 Hrs  T(C): 36.8 (08 Mar 2019 06:12), Max: 36.8 (08 Mar 2019 06:12)  T(F): 98.2 (08 Mar 2019 06:12), Max: 98.2 (08 Mar 2019 06:12)  HR: 61 (08 Mar 2019 06:12) (61 - 86)  BP: 112/77 (08 Mar 2019 06:12) (112/77 - 118/80)    RR: 17 (08 Mar 2019 06:12) (17 - 18)  SpO2: 96% (08 Mar 2019 06:12) (96% - 100%)    abdomen soft nd  incision c/d/i  neg calf tenderness    pod 2 doing well  oob  reg diet

## 2019-03-08 NOTE — PROGRESS NOTE ADULT - SUBJECTIVE AND OBJECTIVE BOX
Patient evaluated at bedside.   She reports pain is well controlled with OPM.  She has been ambulating without assistance, voiding spontaneously, passing gas, tolerating regular diet and is breastfeeding.    She denies HA, dizziness, CP, palpitations, SOB, n/v, or heavy vaginal bleeding.    Physical Exam:  Vital Signs Last 24 Hrs  T(C): 36.8 (08 Mar 2019 06:12), Max: 36.8 (08 Mar 2019 06:12)  T(F): 98.2 (08 Mar 2019 06:12), Max: 98.2 (08 Mar 2019 06:12)  HR: 61 (08 Mar 2019 06:12) (61 - 86)  BP: 112/77 (08 Mar 2019 06:12) (112/77 - 118/80)  BP(mean): --  RR: 17 (08 Mar 2019 06:12) (17 - 18)  SpO2: 96% (08 Mar 2019 06:12) (96% - 100%)    Gen: NAD  Abd: + BS, soft, nontender, nondistended, no rebound or guarding  Incision clean, dry and intact  uterus firm at midline  : lochia WNL  Extremities: no swelling or calf tenderness                          9.0    8.91  )-----------( 165      ( 07 Mar 2019 05:46 )             29.2

## 2019-03-08 NOTE — LACTATION INITIAL EVALUATION - NS LACT CON REASON FOR REQ
multiparous mom multiparous mom/Pt. is a P2 at 39 wks. gestation via .  Pt. denies medical problems.  Pt. states she had a low milk supply with first baby.  First baby was not able to latch to breast.  Pt. stated she did pump for a short time.   Pt. presently pumping and no colostrum collected at this time.  Using a gloved finger baby noted to have a disorganized suck. Baby was positioned to right breast using football hold.   Pt.'s breast shaped and a nipple was created that baby was able to latch on .  Baby was not able to sustain latch and feel off nipple.  Breast again shaped baby able to latch but then falls off.   At this time baby sleeps after coming off breast.  Pt. states baby will either sleep or become fussy.  Baby was then supplemented with 15-20 ml of formula.  Pt. then pumped for twenty minutes followed with hand expression.  Colostrum not able to be collected but fine drops of colostrum was noted on nipple with hand expressing.  Pt. understands triple feeding plan and routine.  Pt. understands she will attempt to latch bay for as long as can stay on breast., supplement with formula, followed with pumping.  Pt. understands to continue to pump even if she is not seeing colostrum.  Reviewed with pt. she is to observe for early feeding cues, encourage skin to skin, attempt t breastfeed every 2-3 hrs., and to monitor voids and stools.  Pt. states she does have pump at home and may rent a hospital grade pump.  Pt. stated pediatrician mentioned possibility of a tongue tie.  At this time a tongue tie was not noted but as mentioned earlier disorganized sucking noted. multiparous mom/Pt. is a P2 at 39 wks. gestation via .  Pt. denies medical problems.  Pt. states she had a low milk supply with first baby.  First baby was not able to latch to breast.  Pt. stated she did pump for a short time.   Pt. presently pumping and no colostrum collected at this time.  Using a gloved finger baby noted to have a disorganized suck. Baby was positioned to right breast using football hold.   Pt.'s breast shaped and a nipple was created that baby was able to latch on .  Baby was not able to sustain latch and fell off nipple.  Breast again shaped baby able to latch but then falls off.   At this time baby sleeps after coming off breast.  Pt. states baby will either sleep or become fussy.  Baby was then supplemented with 15-20 ml of formula.  Pt. then pumped for twenty minutes followed with hand expression.  Colostrum not able to be collected but fine drops of colostrum was noted on nipple with hand expressing.  Pt. understands triple feeding plan and routine.  Pt. understands she will attempt to latch bay for as long as can stay on breast., supplement with formula, followed with pumping.  Pt. understands to continue to pump even if she is not seeing colostrum.  Reviewed with pt. she is to observe for early feeding cues, encourage skin to skin, attempt t breastfeed every 2-3 hrs., and to monitor voids and stools.  Pt. states she does have pump at home and may rent a hospital grade pump.  Pt. stated pediatrician mentioned possibility of a tongue tie.  At this time a tongue tie was not noted but as mentioned earlier disorganized sucking noted.

## 2019-03-08 NOTE — PROGRESS NOTE ADULT - ASSESSMENT
38y Female POD#2 s/p C/S, Uncomplicated                                       1. Neuro/Pain:  OPM  2  CV:  VS per routine  3. Pulm: Encourage ISS & Ambulation  4. GI:  Reg  5. : Voiding  6. DVT ppx: SCDs, SQH 5000 mg BID  7. Dispo: POD #3 or #4

## 2019-03-09 ENCOUNTER — TRANSCRIPTION ENCOUNTER (OUTPATIENT)
Age: 39
End: 2019-03-09

## 2019-03-09 VITALS
DIASTOLIC BLOOD PRESSURE: 87 MMHG | OXYGEN SATURATION: 100 % | HEART RATE: 79 BPM | SYSTOLIC BLOOD PRESSURE: 122 MMHG | TEMPERATURE: 97 F | RESPIRATION RATE: 17 BRPM

## 2019-03-09 PROCEDURE — 36415 COLL VENOUS BLD VENIPUNCTURE: CPT

## 2019-03-09 PROCEDURE — 86780 TREPONEMA PALLIDUM: CPT

## 2019-03-09 PROCEDURE — C1765: CPT

## 2019-03-09 PROCEDURE — 85027 COMPLETE CBC AUTOMATED: CPT

## 2019-03-09 PROCEDURE — 88307 TISSUE EXAM BY PATHOLOGIST: CPT

## 2019-03-09 RX ORDER — BENZOCAINE AND MENTHOL 5; 1 G/100ML; G/100ML
1 LIQUID ORAL
Qty: 0 | Refills: 0 | Status: DISCONTINUED | OUTPATIENT
Start: 2019-03-09 | End: 2019-03-09

## 2019-03-09 RX ADMIN — Medication 600 MILLIGRAM(S): at 07:52

## 2019-03-09 RX ADMIN — Medication 600 MILLIGRAM(S): at 08:30

## 2019-03-09 RX ADMIN — Medication 600 MILLIGRAM(S): at 02:32

## 2019-03-09 RX ADMIN — Medication 600 MILLIGRAM(S): at 02:00

## 2019-03-09 RX ADMIN — Medication 325 MILLIGRAM(S): at 10:17

## 2019-03-09 RX ADMIN — OXYCODONE AND ACETAMINOPHEN 1 TABLET(S): 5; 325 TABLET ORAL at 10:17

## 2019-03-09 RX ADMIN — OXYCODONE AND ACETAMINOPHEN 1 TABLET(S): 5; 325 TABLET ORAL at 03:34

## 2019-03-09 RX ADMIN — OXYCODONE AND ACETAMINOPHEN 1 TABLET(S): 5; 325 TABLET ORAL at 04:30

## 2019-03-09 RX ADMIN — Medication 100 MILLIGRAM(S): at 10:17

## 2019-03-09 RX ADMIN — OXYCODONE AND ACETAMINOPHEN 1 TABLET(S): 5; 325 TABLET ORAL at 11:00

## 2019-03-09 RX ADMIN — Medication 1 TABLET(S): at 10:17

## 2019-03-09 NOTE — PROGRESS NOTE ADULT - SUBJECTIVE AND OBJECTIVE BOX
Patient evaluated at bedside.   She reports pain is well controlled with OPM.  She has been ambulating without assistance, voiding spontaneously, passing gas, tolerating regular diet and is breastfeeding.    She denies HA, dizziness, CP, palpitations, SOB, n/v, or heavy vaginal bleeding.    Physical Exam:  Vital Signs Last 24 Hrs  T(C): 36.6 (08 Mar 2019 18:21), Max: 36.8 (08 Mar 2019 06:12)  T(F): 97.9 (08 Mar 2019 18:21), Max: 98.2 (08 Mar 2019 06:12)  HR: 79 (08 Mar 2019 18:21) (61 - 88)  BP: 113/77 (08 Mar 2019 18:21) (112/77 - 125/85)  BP(mean): --  RR: 18 (08 Mar 2019 18:21) (17 - 18)  SpO2: 99% (08 Mar 2019 18:21) (96% - 99%)    Gen: NAD  Abd: + BS, soft, nontender, nondistended, no rebound or guarding  Incision clean, dry and intact  uterus firm at midline  : lochia WNL  Extremities: no swelling or calf tenderness                          9.0    8.91  )-----------( 165      ( 07 Mar 2019 05:46 )             29.2     MEDICATIONS  (STANDING):  benzocaine 15 mG/menthol 3.6 mG Lozenge 1 Lozenge Oral four times a day  diphtheria/tetanus/pertussis (acellular) Vaccine (ADAcel) 0.5 milliLiter(s) IntraMuscular once  ferrous    sulfate 325 milliGRAM(s) Oral daily  heparin  Injectable 5000 Unit(s) SubCutaneous every 12 hours  ibuprofen  Tablet. 600 milliGRAM(s) Oral every 6 hours  lactated ringers Bolus 500 milliLiter(s) IV Bolus once  lactated ringers. 1000 milliLiter(s) (125 mL/Hr) IV Continuous <Continuous>  oxytocin Infusion 41.667 milliUNIT(s)/Min (125 mL/Hr) IV Continuous <Continuous>  prenatal multivitamin 1 Tablet(s) Oral daily    MEDICATIONS  (PRN):  acetaminophen   Tablet .. 650 milliGRAM(s) Oral every 6 hours PRN Temp greater or equal to 38C (100.4F), Mild Pain (1 - 3)  diphenhydrAMINE 25 milliGRAM(s) Oral every 6 hours PRN Itching  docusate sodium 100 milliGRAM(s) Oral two times a day PRN Stool Softening  glycerin Suppository - Adult 1 Suppository(s) Rectal at bedtime PRN Constipation  lanolin Ointment 1 Application(s) Topical every 3 hours PRN Sore Nipples  oxyCODONE    5 mG/acetaminophen 325 mG 1 Tablet(s) Oral every 3 hours PRN Moderate Pain (4 - 6)  oxyCODONE    5 mG/acetaminophen 325 mG 2 Tablet(s) Oral every 6 hours PRN Severe Pain (7 - 10)  simethicone 80 milliGRAM(s) Chew every 4 hours PRN Gas

## 2019-03-09 NOTE — DISCHARGE NOTE OB - PATIENT PORTAL LINK FT
You can access the [facility] Patient Portal, offered by [facility], by registering with the following website: [email or web address]

## 2019-03-09 NOTE — PROGRESS NOTE ADULT - ASSESSMENT
38y Female POD#3    s/p C/S, Uncomplicated, patient would like to go home today                                       1. Neuro/Pain:  OPM  2  CV:  VS per routine  3. Pulm: Encourage ISS & Ambulation  4. GI:  Reg  5. : Voiding  6. DVT ppx: SCDs, SQH 5000 mg BID  7. Dispo: POD #3 or #4

## 2019-03-09 NOTE — DISCHARGE NOTE OB - CARE PLAN
Principal Discharge DX:	Postpartum state  Goal:	Healthy mom and healthy baby  Assessment and plan of treatment:	38 year old female s/p  section, post operative day 3, meeting all postpartum milestones. No heavy lifting. Pelvic rest until cleared. Take Motrin 600mg every 6 hours and/or Tylenol 650mg every 6 hours as needed for pain. Call your Doctor  to schedule a follow up appointment for 2 weeks. Call your Doctor if you experience severe abdominal pain not improved by oral pain medications, heavy bright red vaginal bleeding saturating more than 1 pad per hour, or fever greater than 100.4F.

## 2019-03-09 NOTE — DISCHARGE NOTE OB - PLAN OF CARE
Healthy mom and healthy baby 38 year old female s/p  section, post operative day 3, meeting all postpartum milestones. No heavy lifting. Pelvic rest until cleared. Take Motrin 600mg every 6 hours and/or Tylenol 650mg every 6 hours as needed for pain. Call your Doctor  to schedule a follow up appointment for 2 weeks. Call your Doctor if you experience severe abdominal pain not improved by oral pain medications, heavy bright red vaginal bleeding saturating more than 1 pad per hour, or fever greater than 100.4F.

## 2019-03-09 NOTE — DISCHARGE NOTE OB - CARE PROVIDER_API CALL
Ivelisse Damon)  Obstetrics and Gynecology  [address] [place] [address]  Phone: [phone]  Fax: [phone]  Follow Up Time:

## 2019-03-09 NOTE — DISCHARGE NOTE OB - MATERIALS PROVIDED
Back To Sleep Handout/Discharge Medication Information for Patients and Families Pocket Guide/Letter of Medical Neccessity/[facility]  Screening Program/Breastfeeding Log/Vaccinations/[facility] Hearing Screen Program/Shaken Baby Prevention Handout/Breastfeeding Mother’s Support Group Information/Guide to Postpartum Care/Nursery  Immunization Record/Bottle Feeding Log/Breastfeeding Guide and Packet/Birth Certificate Instructions

## 2019-03-12 DIAGNOSIS — Z34.83 ENCOUNTER FOR SUPERVISION OF OTHER NORMAL PREGNANCY, THIRD TRIMESTER: ICD-10-CM

## 2019-03-12 DIAGNOSIS — O34.211 MATERNAL CARE FOR LOW TRANSVERSE SCAR FROM PREVIOUS CESAREAN DELIVERY: ICD-10-CM

## 2019-03-12 DIAGNOSIS — Z3A.39 39 WEEKS GESTATION OF PREGNANCY: ICD-10-CM

## 2019-03-14 LAB — SURGICAL PATHOLOGY STUDY: SIGNIFICANT CHANGE UP

## 2020-03-03 NOTE — ED ADULT NURSE NOTE - CHPI ED NUR SYMPTOMS POS
PAIN/CRAMPS/leaking clear fluid
Verbalized Understanding/Simple: Patient demonstrates quick and easy understanding

## 2020-07-27 ENCOUNTER — RESULT REVIEW (OUTPATIENT)
Age: 40
End: 2020-07-27

## 2020-11-04 NOTE — PATIENT PROFILE OB - PRO FIRST TIME PARENT YN OB
[FreeTextEntry1] : 1.anxiety disorder: Sertraline 50 mg once daily refilled today, given updated letter for emotional support animal for air travel.\par \par 2.hypothyroidism: Levothyroxine refill today, recent TFTs done.\par \par 3.lichen sclerosis: Refill clobetasol external cream, follow up in 6 months. no

## 2024-08-22 ENCOUNTER — OFFICE VISIT (OUTPATIENT)
Dept: GASTROENTEROLOGY | Facility: CLINIC | Age: 44
End: 2024-08-22

## 2024-08-22 VITALS
DIASTOLIC BLOOD PRESSURE: 84 MMHG | SYSTOLIC BLOOD PRESSURE: 124 MMHG | WEIGHT: 165 LBS | BODY MASS INDEX: 30.36 KG/M2 | HEIGHT: 62 IN

## 2024-08-22 DIAGNOSIS — R19.4 ALTERED BOWEL HABITS: Primary | ICD-10-CM

## 2024-08-22 DIAGNOSIS — R10.9 ABDOMINAL DISCOMFORT: ICD-10-CM

## 2024-08-22 DIAGNOSIS — K21.9 GASTROESOPHAGEAL REFLUX DISEASE, UNSPECIFIED WHETHER ESOPHAGITIS PRESENT: ICD-10-CM

## 2024-08-22 DIAGNOSIS — R19.5 LOOSE STOOLS: ICD-10-CM

## 2024-08-22 PROCEDURE — 99204 OFFICE O/P NEW MOD 45 MIN: CPT | Performed by: INTERNAL MEDICINE

## 2024-08-22 NOTE — H&P
[facility] - Gastroenterology                                                                                                  Clinic History and Physical       Chief Complaint   Patient presents with    Consult     Acid reflux; related to BM's and causes pain     HPI:   Maryann Hunter is a 44 year old woman with history of BMI of 36 here with her significant other regarding recent gastrointestinal symptoms.    Says she was recently about a week and a half ago in [place] for a trip.  Says she started having blood in the urine as well as flank back pain.  Says she was given some sort of medication that was in an antibiotic when she was in [place].  Began having some loose stools there.  Came back care has seen her primary doctor as well as been in the emergency department and seeing a urologist tomorrow.  Says she has been having some lower abdominal discomfort intermittently.  Bowel movements have been altered sometimes not going for couple days but still also having some loose stools at times.  No blood in the stool.  Denies any nausea vomiting, fevers, rashes.  Did have an antibiotic for 1 day during this time that was given to her by her primary doctor.  Feels like the bowel movements when she has them causes lots of stomach discomfort as well as the feeling of acid reflux coming up into her entire neck and head.  Has also had neck pain and headache symptoms unrelated to the the GI symptoms as well. Denies any GI symptoms prior to this episode in the last 10 days.  Asking if this is related to h.pylori. Has been taking some antacids and started prilosec in the last 1-2 days.     History, Medications, Allergies, ROS:      History reviewed. No pertinent past medical history.   History reviewed. No pertinent surgical history.   Family Hx: History reviewed. No pertinent family history.   Social History:   Social History      Socioeconomic History    Marital status:      Social Determinants of Health     Financial Resource Strain: Low Risk  (12/12/2022)    Received from [facility]    Overall Financial Resource Strain (CARDIA)     Difficulty of Paying Living Expenses: Not hard at all   Food Insecurity: Unknown (12/12/2022)    Received from [facility]    Hunger Vital Sign     Worried About Running Out of Food in the Last Year: Never true   Transportation Needs: Unknown (12/12/2022)    Received from [facility]    PRAPARE - Transportation     Lack of Transportation (Medical): No   Physical Activity: Unknown (3/6/2022)    Received from [facility]    Exercise Vital Sign     Days of Exercise per Week: 4 days   Stress: Stress Concern Present (2/5/2021)    Received from [facility]    [employer] - Occupational Stress Questionnaire     Feeling of Stress : To some extent   Social Connections: Unknown (12/12/2022)    Received from [facility]    Social Connection and Isolation Panel [NHANES]     Frequency of Communication with Friends and Family: More than three times a week    Received from [facility]        Medications (Active prior to today's visit):  Current Outpatient Medications   Medication Sig Dispense Refill    omeprazole 20 MG Oral Capsule Delayed Release Take 1 capsule (20 mg total) by mouth 2 (two) times daily before meals.       Allergies:  No Known Allergies    ROS:   Systems were reviewed and were negative except as noted in the HPI    PHYSICAL EXAM:   Blood pressure 124/84, height 5' 2\" (1.575 m), weight 165 lb (74.8 kg).    General:awake, cooperative, no acute distress  HEENT: EOMI, no scleral icterus, MMM; oral pharnyx is without exudates or lesions  Neck: no lymphadenopathy; thyroid is not enlarged and without nodules  CV: RRR  Resp: non-labored breathing  Abd: soft, non-tender, non-distended  Ext: no lower extremity swelling  Neuro: Alert,  Oriented X 3  Skin: no rashes, bruises  Psych: normal affect    Labs/Imaging:     Reviewed as noted in the HPI and A/P    ASSESSMENT/PLAN:   Maryann Hunter is a 44 year old woman with history of BMI of 36 here with her significant other regarding recent gastrointestinal symptoms.    Review of the chart indicates the patient had a colonoscopy in April 2021.  The results of this are not readily available in the chart.    There is a noted CT scan of the abdomen and pelvis from 8/16/2024 for noted indication of acute onset abdominal pain and right flank pain for 4 days with hematuria.  The impression is noted as unrevealing/unremarkable.  Serologies from 8/19/2024 include an unremarkable CBC, hepatic function panel, lipase. A TSH was elevated but normal T4.    Discussed that the GI symptoms based on the work-up noted above seem a part of a larger systemic process based on the multitude of symptoms. Possibility of a viral gastroenteritis or other viral syndrome. Discussed recommendations for stool testing. Discussed can order an h.pylori stool test however it is atypical for these symptoms. Discussed recommendations for supportive care at this time as outlined below.    Recommend:  -stool tests  -famotidine 1-2 x daily  -antacids prn  -bismuth subasliaylate prn  -consider probiotic    Orders This Visit:  No orders of the defined types were placed in this encounter.    Meds This Visit:  Requested Prescriptions      No prescriptions requested or ordered in this encounter     Imaging & Referrals:  None     Spencer Cruz MD  [facility] - Gastroenterology  8/22/2024

## 2024-08-22 NOTE — PATIENT INSTRUCTIONS
As we discussed complete your stool test.  Take famotidine which is the generic form of Pepcid which you can take 1-2 times daily.  You can also take Tums/antacids on top of this as well.  Pepto-Bismol and its generic form bismuth subsalicylate can be also helpful for the symptoms as well as abdominal discomfort symptoms.  As we discussed be aware it can make the stool dark do not be alarmed.  Regular food is okay.  Perhaps minimize lactose/dairy as we discussed if you are having continued loose stools.  Follow-up with your doctor on your thyroid testing at some point as we discussed.  As we discussed consider a probiotic.

## 2024-08-23 ENCOUNTER — LAB ENCOUNTER (OUTPATIENT)
Dept: LAB | Age: 44
End: 2024-08-23
Attending: INTERNAL MEDICINE
Payer: COMMERCIAL

## 2024-08-23 DIAGNOSIS — R19.4 ALTERED BOWEL HABITS: ICD-10-CM

## 2024-08-23 DIAGNOSIS — R19.5 LOOSE STOOLS: ICD-10-CM

## 2024-08-23 DIAGNOSIS — R10.9 ABDOMINAL DISCOMFORT: ICD-10-CM

## 2024-08-23 PROCEDURE — 87015 SPECIMEN INFECT AGNT CONCNTJ: CPT

## 2024-08-23 PROCEDURE — 87177 OVA AND PARASITES SMEARS: CPT

## 2024-08-23 PROCEDURE — 87209 SMEAR COMPLEX STAIN: CPT

## 2024-08-23 PROCEDURE — 87045 FECES CULTURE AEROBIC BACT: CPT

## 2024-08-23 PROCEDURE — 87046 STOOL CULTR AEROBIC BACT EA: CPT

## 2024-08-23 PROCEDURE — 87427 SHIGA-LIKE TOXIN AG IA: CPT

## 2024-08-27 ENCOUNTER — LAB ENCOUNTER (OUTPATIENT)
Dept: LAB | Facility: REFERENCE LAB | Age: 44
End: 2024-08-27
Attending: INTERNAL MEDICINE
Payer: COMMERCIAL

## 2024-08-27 DIAGNOSIS — R19.4 ALTERED BOWEL HABITS: ICD-10-CM

## 2024-08-27 DIAGNOSIS — R10.9 ABDOMINAL DISCOMFORT: ICD-10-CM

## 2024-08-27 DIAGNOSIS — R19.5 LOOSE STOOLS: ICD-10-CM

## 2024-08-27 PROCEDURE — 87338 HPYLORI STOOL AG IA: CPT

## 2024-08-27 PROCEDURE — 87493 C DIFF AMPLIFIED PROBE: CPT

## 2024-08-28 LAB — C DIFF TOX B STL QL: NEGATIVE

## 2024-08-29 LAB — H PYLORI AG STL QL IA: NEGATIVE
